# Patient Record
Sex: FEMALE | Race: BLACK OR AFRICAN AMERICAN | NOT HISPANIC OR LATINO
[De-identification: names, ages, dates, MRNs, and addresses within clinical notes are randomized per-mention and may not be internally consistent; named-entity substitution may affect disease eponyms.]

---

## 2020-01-13 PROBLEM — Z00.00 ENCOUNTER FOR PREVENTIVE HEALTH EXAMINATION: Status: ACTIVE | Noted: 2020-01-13

## 2020-01-24 ENCOUNTER — APPOINTMENT (OUTPATIENT)
Dept: SURGICAL ONCOLOGY | Facility: CLINIC | Age: 45
End: 2020-01-24
Payer: COMMERCIAL

## 2020-01-24 VITALS
HEIGHT: 65 IN | HEART RATE: 68 BPM | WEIGHT: 154 LBS | DIASTOLIC BLOOD PRESSURE: 94 MMHG | BODY MASS INDEX: 25.66 KG/M2 | SYSTOLIC BLOOD PRESSURE: 150 MMHG | OXYGEN SATURATION: 98 %

## 2020-01-24 DIAGNOSIS — Z86.79 PERSONAL HISTORY OF OTHER DISEASES OF THE CIRCULATORY SYSTEM: ICD-10-CM

## 2020-01-24 DIAGNOSIS — Z87.09 PERSONAL HISTORY OF OTHER DISEASES OF THE RESPIRATORY SYSTEM: ICD-10-CM

## 2020-01-24 DIAGNOSIS — Z78.9 OTHER SPECIFIED HEALTH STATUS: ICD-10-CM

## 2020-01-24 DIAGNOSIS — O26.20 PREGNANCY CARE FOR PATIENT WITH RECURRENT PREGNANCY LOSS, UNSPECIFIED TRIMESTER: ICD-10-CM

## 2020-01-24 DIAGNOSIS — N63.20 UNSPECIFIED LUMP IN THE LEFT BREAST, UNSPECIFIED QUADRANT: ICD-10-CM

## 2020-01-24 DIAGNOSIS — Z85.41 PERSONAL HISTORY OF MALIGNANT NEOPLASM OF CERVIX UTERI: ICD-10-CM

## 2020-01-24 DIAGNOSIS — Z80.3 FAMILY HISTORY OF MALIGNANT NEOPLASM OF BREAST: ICD-10-CM

## 2020-01-24 DIAGNOSIS — Z87.898 PERSONAL HISTORY OF OTHER SPECIFIED CONDITIONS: ICD-10-CM

## 2020-01-24 PROCEDURE — 99244 OFF/OP CNSLTJ NEW/EST MOD 40: CPT

## 2020-01-24 NOTE — OB HISTORY
[Menstruating] : The patient is menstruating [Menarche Age ____] : menarche age [unfilled] [Definite ___ (Date)] : the last menstrual period was [unfilled] [Total Preg ___] : G[unfilled] [Live Births ___] : P[unfilled]  [Abortions ___] : Abortions:[unfilled] [AB Spont ___] : miscarriages: [unfilled]

## 2020-01-30 PROBLEM — Z87.09 HISTORY OF ASTHMA: Status: RESOLVED | Noted: 2020-01-24 | Resolved: 2020-01-30

## 2020-01-30 PROBLEM — O26.20 TWO PREVIOUS MISCARRIAGES, AFFECTING CARE OF MOTHER, ANTEPARTUM: Status: ACTIVE | Noted: 2020-01-24

## 2020-01-30 PROBLEM — Z87.898 HISTORY OF CHEST PAIN: Status: RESOLVED | Noted: 2020-01-24 | Resolved: 2020-01-30

## 2020-01-30 PROBLEM — N63.20 LEFT BREAST MASS: Status: ACTIVE | Noted: 2020-01-24

## 2020-01-30 PROBLEM — Z78.9 NON-SMOKER: Status: ACTIVE | Noted: 2020-01-24

## 2020-01-30 PROBLEM — Z78.9 SOCIAL ALCOHOL USE: Status: ACTIVE | Noted: 2020-01-24

## 2020-01-30 PROBLEM — Z86.79 HISTORY OF CARDIAC MURMUR: Status: RESOLVED | Noted: 2020-01-24 | Resolved: 2020-01-30

## 2020-01-30 PROBLEM — Z80.3 FAMILY HISTORY OF MALIGNANT NEOPLASM OF BREAST: Status: ACTIVE | Noted: 2020-01-24

## 2020-01-30 PROBLEM — Z85.41 HISTORY OF CERVICAL CANCER: Status: RESOLVED | Noted: 2020-01-24 | Resolved: 2020-01-30

## 2020-01-30 RX ORDER — GLUC/MSM/COLGN2/HYAL/ANTIARTH3 375-375-20
TABLET ORAL
Refills: 0 | Status: ACTIVE | COMMUNITY

## 2020-01-30 RX ORDER — PROBENECID 500 MG/1
TABLET ORAL
Refills: 0 | Status: ACTIVE | COMMUNITY

## 2020-01-30 NOTE — HISTORY OF PRESENT ILLNESS
[de-identified] : 43 y/o female patient presents for initial consultation for left breast mass and discordant surgical biopsy.\par \par She is s/p left breast tissue excision on 8/29/19. Pathology: PASH. Fibrocystic changes. Dense stromal fibrosis. Healing biopsy site.\par She is also s/p left breast additional superior margin excision on 8/29/19. Pathology: PASH. Fibrocystic changes. Dense stromal fibrosis. Fat necrosis.\par \par MRI Bilateral Breast (8/16/19): Marked bilateral background enhancement. Probably benign nodule right breast 3:00 without US correlate. Probably benign nonmass enhancement left breast 10:00 which may reflect background enhancement. Recommend short-term MRI follow-up. Signal void left breast 1:00 corresponding to site of prior biopsy, without associated suspicious enhancement. The need for surgical excision may be based on clinical/US findings. BIRADS-3\par \par Diagnostic Left Breast US (5/22/19): Left breast 11:00 4cm fn irregular appearing 1.1cm hypoechoic lesion for which correlation with prior outside imaging and procedural history is recommended. Lesion was reportedly biopsied as benign but increasing on clincial exam. Surgical excision is therefore advised given discordance. Additional hypoechoic lesions in the left breast for which continued follow-up with diagnostic targeted breast US in 6 months is recommended to assess for stability. Further evaluation of clinical symptoms should be based on clinical grounds. Appropriate mammographic follow-up is recommended. BIRADS-4B\par \par Diagnostic Bilateral Breast US/MMG (4/4/18): No mammographic or sonographic evidence of malignancy. Left breast 2:00 8cm fn stable cluster of complicated cysts spanning approximately 0.7x0.7cm. Right breast 10:00 10cm fn 0.8cm simple cyst. BIRADS-2\par \par She is also s/o left breast partial mastectomy and areolar reconstruction on 5/7/18. Pathology: Benign breast parenchyma with dense stromal fibrosis and a healing biopsy site.\par \par She is also s/p right breast excision on 6/12/17. Pathology: Benign breast parenchyma with dense stromal fibrosis, PASH, fibrocystic changes, fibroadenomatoid changes, and focal periductal chronic inflammation. Healing biopsy site. Calcifications within benign epithelium. Right breast skin excision - benign skin with changes of prior procedure.\par \par JOSE Model Lifetime Risk: 10.4% (calculated on 1/24/2020)\par \par She reports two weeks of severe left breast and left arm pain after August 2019 surgical procedure at [place]. She had a wound infection and was admitted to the hospital for 5 days. She continues to have left breast pain that radiates down her left arm.\par She states she has had 4 or 5 left breast surgical resections with the first procedure in 2015 at [place] and the others at [place]. She notes the pathology has never shown any pre-cancerous or cancerous lesions.\par She states she has had precancerous cervical cell results.\par Denies palpable breast masses, nipple discharge, nipple retraction/inversion, or skin changes. \par Denies fever or chills.\par Denies any previous breast biopsies.\par FHx: Grandmother with ovarian and cervical cancer. Aunts with breast cancer. Grandfather with prostate cancer.

## 2020-01-30 NOTE — ADDENDUM
[FreeTextEntry1] : I, Lashell Bernal, acted solely as a scribe for Dr. Luke Pollard on this date 01/24/2020.

## 2020-01-30 NOTE — PHYSICAL EXAM
[Normal] : supple, no neck mass and thyroid not enlarged [Normal Neck Lymph Nodes] : normal neck lymph nodes  [Normal Supraclavicular Lymph Nodes] : normal supraclavicular lymph nodes [Normal Groin Lymph Nodes] : normal groin lymph nodes [Normal Axillary Lymph Nodes] : normal axillary lymph nodes [Normal] : oriented to person, place and time, with appropriate affect [de-identified] : Tender nodularity left upper outer quadrant. Tenderness left axillary tail. Left breast 12:00 scar well healed. Less prominent nodularity right upper outer quadrant. No dominant masses or axillary adenopathy bilaterally.

## 2020-01-30 NOTE — ASSESSMENT
[FreeTextEntry1] : S/p multiple bilateral breast operations most recently left 12:00 in August for PASH and stromal fibrosis\par Post-op course complicated by an abscess requiring reoperation\par Pt has persistent left breast pain radiating into arm since surgery\par Reassured her that this is most likely related to her recent multiple operations and complication\par Also reassured her that index of suspicion for malignancy is low given pathology findings and essentially negative MRI\par Pt has expressed a desire for mastectomy given multiple left breast procedures and symptoms\par However, there is minimal to no deformity in the left breast and again I explained that it may take 6-12 months for her symptoms to resolve\par I do not feel a mastectomy is indicated at this time given low risk status calculated at 10.4% using the JOSE mode\par Will get yearly bilateral MMG/breast US and 6 month f/u left breast MRI and if negative will continue with nonoperative management at this time\par Case also discussed with Dr. Stout of plastic surgery who agrees that we should hold off on any surgery at this time\par Will f/u after imaging to discuss all results

## 2020-03-05 ENCOUNTER — APPOINTMENT (OUTPATIENT)
Dept: MRI IMAGING | Facility: CLINIC | Age: 45
End: 2020-03-05

## 2020-04-02 ENCOUNTER — APPOINTMENT (OUTPATIENT)
Dept: ULTRASOUND IMAGING | Facility: CLINIC | Age: 45
End: 2020-04-02

## 2020-04-02 ENCOUNTER — APPOINTMENT (OUTPATIENT)
Dept: MAMMOGRAPHY | Facility: CLINIC | Age: 45
End: 2020-04-02

## 2020-10-14 ENCOUNTER — APPOINTMENT (OUTPATIENT)
Dept: SURGICAL ONCOLOGY | Facility: CLINIC | Age: 45
End: 2020-10-14
Payer: COMMERCIAL

## 2020-10-14 VITALS
WEIGHT: 145 LBS | HEART RATE: 66 BPM | OXYGEN SATURATION: 98 % | HEIGHT: 65 IN | RESPIRATION RATE: 16 BRPM | BODY MASS INDEX: 24.16 KG/M2

## 2020-10-14 PROCEDURE — 99214 OFFICE O/P EST MOD 30 MIN: CPT

## 2020-10-16 NOTE — HISTORY OF PRESENT ILLNESS
[de-identified] : 44 y/o female patient presents for follow up breast exam. She c/o of a lump on her right breast. \par \par Her LMP was 2 weeks ago. \par \par She is s/p left breast tissue excision on 8/29/19. Pathology: PASH. Fibrocystic changes. Dense stromal fibrosis. Healing biopsy site.\par She is also s/p left breast additional superior margin excision on 8/29/19. Pathology: PASH. Fibrocystic changes. Dense stromal fibrosis. Fat necrosis.\par \par MRI Bilateral Breast (8/16/19): Marked bilateral background enhancement. Probably benign nodule right breast 3:00 without US correlate. Probably benign nonmass enhancement left breast 10:00 which may reflect background enhancement. Recommend short-term MRI follow-up. Signal void left breast 1:00 corresponding to site of prior biopsy, without associated suspicious enhancement. The need for surgical excision may be based on clinical/US findings. BIRADS-3\par \par Diagnostic Left Breast US (5/22/19): Left breast 11:00 4cm fn irregular appearing 1.1cm hypoechoic lesion for which correlation with prior outside imaging and procedural history is recommended. Lesion was reportedly biopsied as benign but increasing on clincial exam. Surgical excision is therefore advised given discordance. Additional hypoechoic lesions in the left breast for which continued follow-up with diagnostic targeted breast US in 6 months is recommended to assess for stability. Further evaluation of clinical symptoms should be based on clinical grounds. Appropriate mammographic follow-up is recommended. BIRADS-4B\par \par Diagnostic Bilateral Breast US/MMG (4/4/18): No mammographic or sonographic evidence of malignancy. Left breast 2:00 8cm fn stable cluster of complicated cysts spanning approximately 0.7x0.7cm. Right breast 10:00 10cm fn 0.8cm simple cyst. BIRADS-2\par \par She is also s/o left breast partial mastectomy and areolar reconstruction on 5/7/18. Pathology: Benign breast parenchyma with dense stromal fibrosis and a healing biopsy site.\par \par She is also s/p right breast excision on 6/12/17. Pathology: Benign breast parenchyma with dense stromal fibrosis, PASH, fibrocystic changes, fibroadenomatoid changes, and focal periductal chronic inflammation. Healing biopsy site. Calcifications within benign epithelium. Right breast skin excision - benign skin with changes of prior procedure.\par \par JOSE Model Lifetime Risk: 10.4% (calculated on 1/24/2020)\par \par She reports two weeks of severe left breast and left arm pain after August 2019 surgical procedure at [place]. She had a wound infection and was admitted to the hospital for 5 days. She continues to have left breast pain that radiates down her left arm.\par She states she has had 4 or 5 left breast surgical resections with the first procedure in 2015 at [place] and the others at [place]. She notes the pathology has never shown any pre-cancerous or cancerous lesions.\par She states she has had precancerous cervical cell results.\par \par Denies palpable breast masses, nipple discharge, nipple retraction/inversion, or skin changes. \par Denies fever or chills.\par Denies any previous breast biopsies.\par FHx: Grandmother with ovarian and cervical cancer. Aunts with breast cancer. Grandfather with prostate cancer.

## 2020-10-16 NOTE — ASSESSMENT
[FreeTextEntry1] : Suspect fibrocystic changes in area of clinical concern.\par Will get official MMG/US \par RTO 1 month

## 2020-10-16 NOTE — ADDENDUM
[FreeTextEntry1] : I, Irja Jordan, acted soley as a scribe for Dr. Luke Pollard on this date 10/14/2020.

## 2020-10-16 NOTE — PHYSICAL EXAM
[Normal] : supple, no neck mass and thyroid not enlarged [Normal Neck Lymph Nodes] : normal neck lymph nodes  [Normal Supraclavicular Lymph Nodes] : normal supraclavicular lymph nodes [Normal Groin Lymph Nodes] : normal groin lymph nodes [Normal Axillary Lymph Nodes] : normal axillary lymph nodes [Normal] : oriented to person, place and time, with appropriate affect [de-identified] : Nodularity right upper quadrant at site of clinical concern likely fibroglandular and fibrocystic tissue . No other masses or adenopathy bilaterally. US shows fibrocystic tissue with small centrally located cysts.

## 2020-10-22 ENCOUNTER — RESULT REVIEW (OUTPATIENT)
Age: 45
End: 2020-10-22

## 2020-10-22 ENCOUNTER — OUTPATIENT (OUTPATIENT)
Dept: OUTPATIENT SERVICES | Facility: HOSPITAL | Age: 45
LOS: 1 days | End: 2020-10-22
Payer: COMMERCIAL

## 2020-10-22 ENCOUNTER — APPOINTMENT (OUTPATIENT)
Dept: ULTRASOUND IMAGING | Facility: IMAGING CENTER | Age: 45
End: 2020-10-22

## 2020-10-22 ENCOUNTER — OUTPATIENT (OUTPATIENT)
Dept: OUTPATIENT SERVICES | Facility: HOSPITAL | Age: 45
LOS: 1 days | End: 2020-10-22

## 2020-10-22 ENCOUNTER — APPOINTMENT (OUTPATIENT)
Dept: MAMMOGRAPHY | Facility: IMAGING CENTER | Age: 45
End: 2020-10-22

## 2020-10-22 DIAGNOSIS — N60.19 DIFFUSE CYSTIC MASTOPATHY OF UNSPECIFIED BREAST: ICD-10-CM

## 2020-10-22 DIAGNOSIS — Z00.8 ENCOUNTER FOR OTHER GENERAL EXAMINATION: ICD-10-CM

## 2020-10-22 PROCEDURE — 77066 DX MAMMO INCL CAD BI: CPT

## 2020-10-22 PROCEDURE — 77066 DX MAMMO INCL CAD BI: CPT | Mod: 26

## 2020-10-22 PROCEDURE — G0279: CPT

## 2020-10-22 PROCEDURE — 77062 BREAST TOMOSYNTHESIS BI: CPT | Mod: 26

## 2020-10-22 PROCEDURE — 76641 ULTRASOUND BREAST COMPLETE: CPT

## 2020-10-22 PROCEDURE — 76641 ULTRASOUND BREAST COMPLETE: CPT | Mod: 26,50

## 2020-10-24 ENCOUNTER — RESULT REVIEW (OUTPATIENT)
Age: 45
End: 2020-10-24

## 2020-10-24 ENCOUNTER — APPOINTMENT (OUTPATIENT)
Dept: MRI IMAGING | Facility: IMAGING CENTER | Age: 45
End: 2020-10-24
Payer: COMMERCIAL

## 2020-10-24 ENCOUNTER — OUTPATIENT (OUTPATIENT)
Dept: OUTPATIENT SERVICES | Facility: HOSPITAL | Age: 45
LOS: 1 days | End: 2020-10-24
Payer: COMMERCIAL

## 2020-10-24 DIAGNOSIS — N60.19 DIFFUSE CYSTIC MASTOPATHY OF UNSPECIFIED BREAST: ICD-10-CM

## 2020-10-24 DIAGNOSIS — Z00.8 ENCOUNTER FOR OTHER GENERAL EXAMINATION: ICD-10-CM

## 2020-10-24 PROCEDURE — C8908: CPT

## 2020-10-24 PROCEDURE — C8937: CPT

## 2020-10-24 PROCEDURE — 77049 MRI BREAST C-+ W/CAD BI: CPT | Mod: 26

## 2020-10-24 PROCEDURE — A9585: CPT

## 2020-11-18 ENCOUNTER — APPOINTMENT (OUTPATIENT)
Dept: SURGICAL ONCOLOGY | Facility: CLINIC | Age: 45
End: 2020-11-18
Payer: COMMERCIAL

## 2020-11-18 VITALS
HEIGHT: 65 IN | BODY MASS INDEX: 25.16 KG/M2 | HEART RATE: 67 BPM | DIASTOLIC BLOOD PRESSURE: 69 MMHG | RESPIRATION RATE: 15 BRPM | WEIGHT: 151 LBS | SYSTOLIC BLOOD PRESSURE: 122 MMHG | OXYGEN SATURATION: 100 %

## 2020-11-18 DIAGNOSIS — N60.19 DIFFUSE CYSTIC MASTOPATHY OF UNSPECIFIED BREAST: ICD-10-CM

## 2020-11-18 PROCEDURE — 99214 OFFICE O/P EST MOD 30 MIN: CPT

## 2020-11-18 RX ORDER — IBUPROFEN 600 MG/1
600 TABLET, FILM COATED ORAL
Qty: 60 | Refills: 3 | Status: ACTIVE | COMMUNITY
Start: 2020-11-18 | End: 1900-01-01

## 2020-11-18 NOTE — PHYSICAL EXAM
[Normal] : supple, no neck mass and thyroid not enlarged [Normal Neck Lymph Nodes] : normal neck lymph nodes  [Normal Supraclavicular Lymph Nodes] : normal supraclavicular lymph nodes [Normal Groin Lymph Nodes] : normal groin lymph nodes [Normal Axillary Lymph Nodes] : normal axillary lymph nodes [Normal] : oriented to person, place and time, with appropriate affect [de-identified] : right breast 12:00 scar well healed. left areolar scar well healed.

## 2020-11-18 NOTE — HISTORY OF PRESENT ILLNESS
[de-identified] : 46 y/o female patient presents for follow up breast exam. Pt c/o pain in the right breast. \par \par MRI 10/24/20:\par 1. New probable benign 5 mm focus of enhancement inferior central left breast. A 6 month follow-up breast MRI is recommended to ascertain stability.\par 2. Previously reported 5 mm enhancing nodule right breast 3:00 axis, is without significant change since 8/2019. Continued six-month follow-up breast MRI recommended.\par 3. Asymmetrically borderline prominent left axillary lymph nodes, without significant change since 8/2019. Further management and assessment on clinical grounds is suggested. If clinically warranted, this May BE further evaluated by targeted ultrasound.\par Recommendation of MRI in 6 months. BI-RADS 3 finding.\par \par Recent bilateral breast US/MMG from 10/22/20 revealed no mammographic or sonographic evidence of malignancy. Recommend clinical evaluation of palpable abnormalities in the right breast. \par Clinical follow-up recommended. \par BI-RADS 2\par \par She is s/p left breast tissue excision on 8/29/19. Pathology: PASH. Fibrocystic changes. Dense stromal fibrosis. Healing biopsy site.\par She is also s/p left breast additional superior margin excision on 8/29/19. Pathology: PASH. Fibrocystic changes. Dense stromal fibrosis. Fat necrosis.\par \par MRI Bilateral Breast (8/16/19): Marked bilateral background enhancement. Probably benign nodule right breast 3:00 without US correlate. Probably benign nonmass enhancement left breast 10:00 which may reflect background enhancement. Recommend short-term MRI follow-up. Signal void left breast 1:00 corresponding to site of prior biopsy, without associated suspicious enhancement. The need for surgical excision may be based on clinical/US findings. BIRADS-3\par \par Diagnostic Left Breast US (5/22/19): Left breast 11:00 4cm fn irregular appearing 1.1cm hypoechoic lesion for which correlation with prior outside imaging and procedural history is recommended. Lesion was reportedly biopsied as benign but increasing on clincial exam. Surgical excision is therefore advised given discordance. Additional hypoechoic lesions in the left breast for which continued follow-up with diagnostic targeted breast US in 6 months is recommended to assess for stability. Further evaluation of clinical symptoms should be based on clinical grounds. Appropriate mammographic follow-up is recommended. BIRADS-4B\par \par Diagnostic Bilateral Breast US/MMG (4/4/18): No mammographic or sonographic evidence of malignancy. Left breast 2:00 8cm fn stable cluster of complicated cysts spanning approximately 0.7x0.7cm. Right breast 10:00 10cm fn 0.8cm simple cyst. BIRADS-2\par \par She is also s/o left breast partial mastectomy and areolar reconstruction on 5/7/18. Pathology: Benign breast parenchyma with dense stromal fibrosis and a healing biopsy site.\par \par She is also s/p right breast excision on 6/12/17. Pathology: Benign breast parenchyma with dense stromal fibrosis, PASH, fibrocystic changes, fibroadenomatoid changes, and focal periductal chronic inflammation. Healing biopsy site. Calcifications within benign epithelium. Right breast skin excision - benign skin with changes of prior procedure.\par \par JOSE Model Lifetime Risk: 10.4% (calculated on 1/24/2020)\par \par She reports two weeks of severe left breast and left arm pain after August 2019 surgical procedure at [place]. She had a wound infection and was admitted to the hospital for 5 days. She continues to have left breast pain that radiates down her left arm.\par She states she has had 4 or 5 left breast surgical resections with the first procedure in 2015 at [place] and the others at [place]. She notes the pathology has never shown any pre-cancerous or cancerous lesions.\par She states she has had precancerous cervical cell results.\par \par Denies palpable breast masses, nipple discharge, nipple retraction/inversion, or skin changes. \par Denies fever or chills.\par Denies any previous breast biopsies.\par FHx: Grandmother with ovarian and cervical cancer. Aunts with breast cancer. Grandfather with prostate cancer.

## 2020-11-18 NOTE — ASSESSMENT
[FreeTextEntry1] : BI-RADS 2 MMG/US\par BI-RADS 3 MRI\par Left breast enhancement\par I had a long discussion with the pt regarding her breast pain which I feel is a combination of costochondritis and hormonal pain\par Recommend vitamin e and/or primrose prn for breast pain\par Recommend 600mg of Motrin poq 6 hours x 48 hours then q 6 hours prn.\par Discussed genetic testing given her early family history of prostate cancer and breast cancer of aunts and uncles\par RTO 1 month\par Will get 6 month follow up MRI in April 2021

## 2020-11-18 NOTE — ADDENDUM
[FreeTextEntry1] : I, Rosa Henry, acted solely as a scribe for Dr. Luke Pollard on this date 11/18/2020.\par

## 2020-12-11 ENCOUNTER — APPOINTMENT (OUTPATIENT)
Dept: SURGICAL ONCOLOGY | Facility: CLINIC | Age: 45
End: 2020-12-11

## 2020-12-11 NOTE — ADDENDUM
[FreeTextEntry1] : I, Rosa Henry, acted solely as a scribe for Dr. Luke Pollard on this date 12/11/2020.\par \par

## 2020-12-11 NOTE — PHYSICAL EXAM
[Normal] : supple, no neck mass and thyroid not enlarged [Normal Neck Lymph Nodes] : normal neck lymph nodes  [Normal Supraclavicular Lymph Nodes] : normal supraclavicular lymph nodes [Normal Groin Lymph Nodes] : normal groin lymph nodes [Normal Axillary Lymph Nodes] : normal axillary lymph nodes [Normal] : oriented to person, place and time, with appropriate affect [de-identified] : right breast 12:00 scar well healed. left areolar scar well healed.

## 2020-12-11 NOTE — HISTORY OF PRESENT ILLNESS
[de-identified] : 46 y/o female patient presents for follow up breast exam. \par \par MRI 10/24/20:\par 1. New probable benign 5 mm focus of enhancement inferior central left breast. A 6 month follow-up breast MRI is recommended to ascertain stability.\par 2. Previously reported 5 mm enhancing nodule right breast 3:00 axis, is without significant change since 8/2019. Continued six-month follow-up breast MRI recommended.\par 3. Asymmetrically borderline prominent left axillary lymph nodes, without significant change since 8/2019. Further management and assessment on clinical grounds is suggested. If clinically warranted, this May BE further evaluated by targeted ultrasound.\par Recommendation of MRI in 6 months. BI-RADS 3 finding.\par \par Recent bilateral breast US/MMG from 10/22/20 revealed no mammographic or sonographic evidence of malignancy. Recommend clinical evaluation of palpable abnormalities in the right breast. \par Clinical follow-up recommended. \par BI-RADS 2\par \par She is s/p left breast tissue excision on 8/29/19. Pathology: PASH. Fibrocystic changes. Dense stromal fibrosis. Healing biopsy site.\par She is also s/p left breast additional superior margin excision on 8/29/19. Pathology: PASH. Fibrocystic changes. Dense stromal fibrosis. Fat necrosis.\par \par MRI Bilateral Breast (8/16/19): Marked bilateral background enhancement. Probably benign nodule right breast 3:00 without US correlate. Probably benign nonmass enhancement left breast 10:00 which may reflect background enhancement. Recommend short-term MRI follow-up. Signal void left breast 1:00 corresponding to site of prior biopsy, without associated suspicious enhancement. The need for surgical excision may be based on clinical/US findings. BIRADS-3\par \par Diagnostic Left Breast US (5/22/19): Left breast 11:00 4cm fn irregular appearing 1.1cm hypoechoic lesion for which correlation with prior outside imaging and procedural history is recommended. Lesion was reportedly biopsied as benign but increasing on clincial exam. Surgical excision is therefore advised given discordance. Additional hypoechoic lesions in the left breast for which continued follow-up with diagnostic targeted breast US in 6 months is recommended to assess for stability. Further evaluation of clinical symptoms should be based on clinical grounds. Appropriate mammographic follow-up is recommended. BIRADS-4B\par \par Diagnostic Bilateral Breast US/MMG (4/4/18): No mammographic or sonographic evidence of malignancy. Left breast 2:00 8cm fn stable cluster of complicated cysts spanning approximately 0.7x0.7cm. Right breast 10:00 10cm fn 0.8cm simple cyst. BIRADS-2\par \par She is also s/o left breast partial mastectomy and areolar reconstruction on 5/7/18. Pathology: Benign breast parenchyma with dense stromal fibrosis and a healing biopsy site.\par \par She is also s/p right breast excision on 6/12/17. Pathology: Benign breast parenchyma with dense stromal fibrosis, PASH, fibrocystic changes, fibroadenomatoid changes, and focal periductal chronic inflammation. Healing biopsy site. Calcifications within benign epithelium. Right breast skin excision - benign skin with changes of prior procedure.\par \par JOSE Model Lifetime Risk: 10.4% (calculated on 1/24/2020)\par \par She reports two weeks of severe left breast and left arm pain after August 2019 surgical procedure at [place]. She had a wound infection and was admitted to the hospital for 5 days. She continues to have left breast pain that radiates down her left arm.\par She states she has had 4 or 5 left breast surgical resections with the first procedure in 2015 at [place] and the others at [place]. She notes the pathology has never shown any pre-cancerous or cancerous lesions.\par She states she has had precancerous cervical cell results.\par \par Denies palpable breast masses, nipple discharge, nipple retraction/inversion, or skin changes. \par Denies fever or chills.\par Denies any previous breast biopsies.\par FHx: Grandmother with ovarian and cervical cancer. Aunts with breast cancer. Grandfather with prostate cancer.

## 2021-04-12 ENCOUNTER — APPOINTMENT (OUTPATIENT)
Dept: MRI IMAGING | Facility: CLINIC | Age: 46
End: 2021-04-12
Payer: COMMERCIAL

## 2021-04-12 ENCOUNTER — OUTPATIENT (OUTPATIENT)
Dept: OUTPATIENT SERVICES | Facility: HOSPITAL | Age: 46
LOS: 1 days | End: 2021-04-12
Payer: COMMERCIAL

## 2021-04-12 DIAGNOSIS — N60.19 DIFFUSE CYSTIC MASTOPATHY OF UNSPECIFIED BREAST: ICD-10-CM

## 2021-04-12 DIAGNOSIS — Z00.8 ENCOUNTER FOR OTHER GENERAL EXAMINATION: ICD-10-CM

## 2021-04-12 PROCEDURE — 77049 MRI BREAST C-+ W/CAD BI: CPT | Mod: 26

## 2021-04-12 PROCEDURE — C8908: CPT

## 2021-04-12 PROCEDURE — C8937: CPT

## 2021-04-12 PROCEDURE — A9585: CPT

## 2021-07-20 ENCOUNTER — APPOINTMENT (OUTPATIENT)
Dept: PULMONOLOGY | Facility: CLINIC | Age: 46
End: 2021-07-20

## 2024-08-20 ENCOUNTER — APPOINTMENT (OUTPATIENT)
Age: 49
End: 2024-08-20
Payer: COMMERCIAL

## 2024-08-20 VITALS
WEIGHT: 141 LBS | SYSTOLIC BLOOD PRESSURE: 146 MMHG | HEART RATE: 66 BPM | OXYGEN SATURATION: 96 % | RESPIRATION RATE: 14 BRPM | BODY MASS INDEX: 23.49 KG/M2 | DIASTOLIC BLOOD PRESSURE: 81 MMHG | HEIGHT: 65 IN

## 2024-08-20 DIAGNOSIS — J45.909 UNSPECIFIED ASTHMA, UNCOMPLICATED: ICD-10-CM

## 2024-08-20 DIAGNOSIS — I10 ESSENTIAL (PRIMARY) HYPERTENSION: ICD-10-CM

## 2024-08-20 DIAGNOSIS — Z78.9 OTHER SPECIFIED HEALTH STATUS: ICD-10-CM

## 2024-08-20 PROCEDURE — 99204 OFFICE O/P NEW MOD 45 MIN: CPT

## 2024-08-20 RX ORDER — AMLODIPINE BESYLATE 2.5 MG/1
2.5 TABLET ORAL DAILY
Qty: 30 | Refills: 3 | Status: ACTIVE | COMMUNITY
Start: 2024-08-20 | End: 1900-01-01

## 2024-08-20 NOTE — ASSESSMENT
[FreeTextEntry1] : 50 yo F. with PMH HTN, Asthma presents to establish care. Admits to working as , her BP has been elevated intermittently and has hx of Asthma for which she has to use PRN Ventolin often. On exam, lungs are clear, in NAD. Care plans discussed - will need PFT on next visit, given Breztri trial. Started on Norvasc 2.5mg given her elevated BP.

## 2025-02-27 ENCOUNTER — APPOINTMENT (OUTPATIENT)
Age: 50
End: 2025-02-27

## 2025-06-04 NOTE — END OF VISIT
[Normocephalic] : normocephalic [Atraumatic] : atraumatic [Time Spent: ___ minutes] : I have spent [unfilled] minutes of time on the encounter. [>50% of the face to face encounter time was spent on counseling and/or coordination of care for ___] : Greater than 50% of the face to face encounter time was spent on counseling and/or coordination of care for [unfilled] [Supple] : supple [No Supraclavicular Adenopathy] : no supraclavicular adenopathy [No Cervical Adenopathy] : no cervical adenopathy [No Thyromegaly] : no thyromegaly [Normal Sinus Rhythm] : normal sinus rhythm [Examined in the supine and seated position] : examined in the supine and seated position [No dominant masses] : no dominant masses in right breast  [No dominant masses] : no dominant masses left breast [No Nipple Retraction] : no left nipple retraction [No Nipple Discharge] : no left nipple discharge [No Axillary Lymphadenopathy] : no left axillary lymphadenopathy [No Edema] : no edema [No Rashes] : no rashes [No Ulceration] : no ulceration [de-identified] : +FC tissue NSF  [de-identified] : +FC tissue NSF